# Patient Record
Sex: FEMALE | Race: WHITE | URBAN - METROPOLITAN AREA
[De-identification: names, ages, dates, MRNs, and addresses within clinical notes are randomized per-mention and may not be internally consistent; named-entity substitution may affect disease eponyms.]

---

## 2021-02-23 ENCOUNTER — OFFICE VISIT (OUTPATIENT)
Dept: URBAN - METROPOLITAN AREA CLINIC 92 | Facility: CLINIC | Age: 76
End: 2021-02-23
Payer: MEDICARE

## 2021-02-23 DIAGNOSIS — K59.09 CHRONIC CONSTIPATION: ICD-10-CM

## 2021-02-23 PROCEDURE — 99204 OFFICE O/P NEW MOD 45 MIN: CPT | Performed by: INTERNAL MEDICINE

## 2021-02-23 RX ORDER — DILTIAZEM HYDROCHLORIDE 240 MG/1
(PRIOR AUTH#:000001478650) CAPSULE, EXTENDED RELEASE ORAL
Qty: 90 CAP | Status: ACTIVE | COMMUNITY

## 2021-02-23 RX ORDER — ROSUVASTATIN CALCIUM 5 MG/1
1 TABLET TABLET, FILM COATED ORAL ONCE A DAY
Status: ACTIVE | COMMUNITY

## 2021-02-23 NOTE — HPI-OTHER HISTORIES
74 yo female referred by Dr Melo Guillen for chronic constipation and a copy of this note will be sent to the referring physician. Pt has had chronic constipation all of her life. At [facility] she had bladder infection and had cipro. Pt has had passage of pellets and maryjo for a while. Pt has a prolapsed bladder and so she tries not to push. Pt was using colace, prune juice that had worked. Pt moved here from [place] after [place]. Pt had a colonoscopy 10 yrs ago normal, no polyps. No fam hx of colon cancer. Pt says the constipation is not new. Pt no longer passing pellets is getting better. Pt says over the last few weeks had to take dulcoloax.. Pt not sure she wants to undergo a colonoscopy. Pt had 9 and 19 lb babies.

## 2021-03-30 ENCOUNTER — WEB ENCOUNTER (OUTPATIENT)
Dept: URBAN - METROPOLITAN AREA CLINIC 96 | Facility: CLINIC | Age: 76
End: 2021-03-30

## 2021-03-30 ENCOUNTER — OFFICE VISIT (OUTPATIENT)
Dept: URBAN - METROPOLITAN AREA CLINIC 96 | Facility: CLINIC | Age: 76
End: 2021-03-30
Payer: MEDICARE

## 2021-03-30 ENCOUNTER — DASHBOARD ENCOUNTERS (OUTPATIENT)
Age: 76
End: 2021-03-30

## 2021-03-30 DIAGNOSIS — K59.09 CHRONIC CONSTIPATION: ICD-10-CM

## 2021-03-30 DIAGNOSIS — N81.10 BLADDER PROLAPSE, FEMALE, ACQUIRED: ICD-10-CM

## 2021-03-30 PROBLEM — 252005008: Status: ACTIVE | Noted: 2021-03-30

## 2021-03-30 PROCEDURE — 99204 OFFICE O/P NEW MOD 45 MIN: CPT | Performed by: INTERNAL MEDICINE

## 2021-03-30 RX ORDER — DILTIAZEM HYDROCHLORIDE 240 MG/1
(PRIOR AUTH#:000001478650) CAPSULE, EXTENDED RELEASE ORAL
Qty: 90 CAP | COMMUNITY

## 2021-03-30 RX ORDER — ROSUVASTATIN CALCIUM 5 MG/1
1 TABLET TABLET, FILM COATED ORAL ONCE A DAY
COMMUNITY

## 2021-03-30 NOTE — HPI-OTHER HISTORIES
76 yo female referred by Dr Melo Guillen for chronic constipation and a copy of this note will be sent to the referring physician. Pt has had chronic constipation all of her life. At [age] she had bladder infection and had cipro. Pt has had passage of pellets and maryjo for a while. Pt has a prolapsed bladder and so she tries not to push. Pt was using colace, prune juice that had worked. Pt moved here from [place] after [place]. Pt had a colonoscopy 10 yrs ago normal, no polyps. No fam hx of colon cancer. Pt says the constipation is not new. Pt no longer passing pellets is getting better. Pt says over the last few weeks had to take dulcoloax.. Pt not sure she wants to undergo a colonoscopy. Pt had 9 and 19 lb babies. Pt was seen and started on miralax and f/u to see if this is working. Pt really likes the miralax and she says it been life changing. Pt is now moving her bowels daily or every other day. Pt had a colonoscopy 10-12 yrs ago and it was negative and no fam hx of colon cancer.